# Patient Record
Sex: FEMALE | Race: BLACK OR AFRICAN AMERICAN | NOT HISPANIC OR LATINO
[De-identification: names, ages, dates, MRNs, and addresses within clinical notes are randomized per-mention and may not be internally consistent; named-entity substitution may affect disease eponyms.]

---

## 2021-06-10 ENCOUNTER — RESULT REVIEW (OUTPATIENT)
Age: 26
End: 2021-06-10

## 2022-09-06 ENCOUNTER — NON-APPOINTMENT (OUTPATIENT)
Age: 27
End: 2022-09-06

## 2023-02-08 ENCOUNTER — EMERGENCY (EMERGENCY)
Facility: HOSPITAL | Age: 28
LOS: 1 days | Discharge: HOME | End: 2023-02-08
Attending: STUDENT IN AN ORGANIZED HEALTH CARE EDUCATION/TRAINING PROGRAM | Admitting: STUDENT IN AN ORGANIZED HEALTH CARE EDUCATION/TRAINING PROGRAM
Payer: COMMERCIAL

## 2023-02-08 VITALS
DIASTOLIC BLOOD PRESSURE: 77 MMHG | RESPIRATION RATE: 18 BRPM | HEART RATE: 85 BPM | TEMPERATURE: 98 F | OXYGEN SATURATION: 98 % | SYSTOLIC BLOOD PRESSURE: 110 MMHG

## 2023-02-08 LAB
ALBUMIN SERPL ELPH-MCNC: 4.3 G/DL — SIGNIFICANT CHANGE UP (ref 3.3–5)
ALP SERPL-CCNC: 28 U/L — LOW (ref 40–120)
ALT FLD-CCNC: 6 U/L — SIGNIFICANT CHANGE UP (ref 4–33)
ANION GAP SERPL CALC-SCNC: 9 MMOL/L — SIGNIFICANT CHANGE UP (ref 7–14)
AST SERPL-CCNC: 16 U/L — SIGNIFICANT CHANGE UP (ref 4–32)
BASOPHILS # BLD AUTO: 0.02 K/UL — SIGNIFICANT CHANGE UP (ref 0–0.2)
BASOPHILS NFR BLD AUTO: 0.4 % — SIGNIFICANT CHANGE UP (ref 0–2)
BILIRUB SERPL-MCNC: 0.2 MG/DL — SIGNIFICANT CHANGE UP (ref 0.2–1.2)
BUN SERPL-MCNC: 10 MG/DL — SIGNIFICANT CHANGE UP (ref 7–23)
CALCIUM SERPL-MCNC: 9.2 MG/DL — SIGNIFICANT CHANGE UP (ref 8.4–10.5)
CHLORIDE SERPL-SCNC: 107 MMOL/L — SIGNIFICANT CHANGE UP (ref 98–107)
CO2 SERPL-SCNC: 22 MMOL/L — SIGNIFICANT CHANGE UP (ref 22–31)
CREAT SERPL-MCNC: 0.77 MG/DL — SIGNIFICANT CHANGE UP (ref 0.5–1.3)
EGFR: 108 ML/MIN/1.73M2 — SIGNIFICANT CHANGE UP
EOSINOPHIL # BLD AUTO: 0.08 K/UL — SIGNIFICANT CHANGE UP (ref 0–0.5)
EOSINOPHIL NFR BLD AUTO: 1.7 % — SIGNIFICANT CHANGE UP (ref 0–6)
GLUCOSE SERPL-MCNC: 89 MG/DL — SIGNIFICANT CHANGE UP (ref 70–99)
HCT VFR BLD CALC: 32.4 % — LOW (ref 34.5–45)
HGB BLD-MCNC: 10 G/DL — LOW (ref 11.5–15.5)
IANC: 1.75 K/UL — LOW (ref 1.8–7.4)
IMM GRANULOCYTES NFR BLD AUTO: 0 % — SIGNIFICANT CHANGE UP (ref 0–0.9)
LYMPHOCYTES # BLD AUTO: 2.6 K/UL — SIGNIFICANT CHANGE UP (ref 1–3.3)
LYMPHOCYTES # BLD AUTO: 54.1 % — HIGH (ref 13–44)
MAGNESIUM SERPL-MCNC: 2.1 MG/DL — SIGNIFICANT CHANGE UP (ref 1.6–2.6)
MCHC RBC-ENTMCNC: 22.1 PG — LOW (ref 27–34)
MCHC RBC-ENTMCNC: 30.9 GM/DL — LOW (ref 32–36)
MCV RBC AUTO: 71.7 FL — LOW (ref 80–100)
MONOCYTES # BLD AUTO: 0.36 K/UL — SIGNIFICANT CHANGE UP (ref 0–0.9)
MONOCYTES NFR BLD AUTO: 7.5 % — SIGNIFICANT CHANGE UP (ref 2–14)
NEUTROPHILS # BLD AUTO: 1.75 K/UL — LOW (ref 1.8–7.4)
NEUTROPHILS NFR BLD AUTO: 36.3 % — LOW (ref 43–77)
NRBC # BLD: 0 /100 WBCS — SIGNIFICANT CHANGE UP (ref 0–0)
NRBC # FLD: 0 K/UL — SIGNIFICANT CHANGE UP (ref 0–0)
PHOSPHATE SERPL-MCNC: 3.5 MG/DL — SIGNIFICANT CHANGE UP (ref 2.5–4.5)
PLATELET # BLD AUTO: 216 K/UL — SIGNIFICANT CHANGE UP (ref 150–400)
POTASSIUM SERPL-MCNC: 4.1 MMOL/L — SIGNIFICANT CHANGE UP (ref 3.5–5.3)
POTASSIUM SERPL-SCNC: 4.1 MMOL/L — SIGNIFICANT CHANGE UP (ref 3.5–5.3)
PROT SERPL-MCNC: 7.3 G/DL — SIGNIFICANT CHANGE UP (ref 6–8.3)
RBC # BLD: 4.52 M/UL — SIGNIFICANT CHANGE UP (ref 3.8–5.2)
RBC # FLD: 14.7 % — HIGH (ref 10.3–14.5)
SODIUM SERPL-SCNC: 138 MMOL/L — SIGNIFICANT CHANGE UP (ref 135–145)
TROPONIN T, HIGH SENSITIVITY RESULT: <6 NG/L — SIGNIFICANT CHANGE UP
TSH SERPL-MCNC: 1.37 UIU/ML — SIGNIFICANT CHANGE UP (ref 0.27–4.2)
WBC # BLD: 4.81 K/UL — SIGNIFICANT CHANGE UP (ref 3.8–10.5)
WBC # FLD AUTO: 4.81 K/UL — SIGNIFICANT CHANGE UP (ref 3.8–10.5)

## 2023-02-08 PROCEDURE — 71046 X-RAY EXAM CHEST 2 VIEWS: CPT | Mod: 26

## 2023-02-08 PROCEDURE — 99285 EMERGENCY DEPT VISIT HI MDM: CPT

## 2023-02-08 NOTE — ED PROVIDER NOTE - NS ED ROS FT
ROS   GENERAL: No fever, no chills, no malaise, no fatigue   ENT: No earache, no coryza, no sore throat   NECK: No stiffness, no swollen glands, no dysphagia, no masses or goiter  RESPIRATORY: No cough, no dyspnea, no pleuritic chest pain   HEART: See HPI  ABDOMEN: No abdominal pain, no nausea, no vomiting, no diarrhea   : No dysuria, no increase frequency, no urgency, No discharge   MUSCULAR-SKELETAL: No myalgia, no arthralgia   NEUROLOGY: No headache, no vertigo, no paresthesia, no focal deficits, no diplopia   SKIN: No rash, no evidence of trauma  All other ROS are negative

## 2023-02-08 NOTE — ED PROVIDER NOTE - PATIENT PORTAL LINK FT
You can access the FollowMyHealth Patient Portal offered by [facility] by registering at the following website: [email or web address]. By joining [employer]’s FollowMyHealth portal, you will also be able to view your health information using other applications (apps) compatible with our system.

## 2023-02-08 NOTE — ED ADULT NURSE NOTE - NSIMPLEMENTINTERV_GEN_ALL_ED
Implemented All Universal Safety Interventions:  [place] to call system. Call bell, personal items and telephone within reach. Instruct patient to call for assistance. Room bathroom lighting operational. Non-slip footwear when patient is off stretcher. Physically safe environment: no spills, clutter or unnecessary equipment. Stretcher in lowest position, wheels locked, appropriate side rails in place.

## 2023-02-08 NOTE — ED PROVIDER NOTE - CLINICAL SUMMARY MEDICAL DECISION MAKING FREE TEXT BOX
20-year-old female denies past medical history currently being evaluated for palpitations by [facility] with place Holter monitor presents complaining of recurrent episode of dizziness with associated heart rate in the 150s today as per her Apple Watch. Patient states she has a follow-up appointment in 3 weeks for echocardiogram and mailed out the Holter monitor tomorrow.  Today while on the train she became lightheaded and dizzy and her symptoms persisted after getting home so she came to the emergency department.  Currently asymptomatic at this time and heart rate 85 on presentation.  Possible dysrhythmia versus electrolyte abnormality versus ACS less likely.  Will obtain CBC, CMP, magnesium, phosphorus, EKG, TNI.  Place patient on cardiac monitoring for dysrhythmia evaluation.  Symptomatic control as needed.

## 2023-02-08 NOTE — ED ADULT NURSE NOTE - OBJECTIVE STATEMENT
pt received at intake AAOx 3. pt reports dizziness and palpitations x 2 weeks. pt states she has a halter monitor placed. pt also reports nausea and generalized weakness. pt denies sob, chest pain, fevers, chills. RR even and unlabored. 20g iv placed to left arm.

## 2023-02-08 NOTE — ED PROVIDER NOTE - PHYSICAL EXAMINATION
GEN - NAD; well appearing; A&O x3   HEAD - NC/AT   EYES- PERRL, EOMI  ENT: Airway patent, mmm, Oral cavity and pharynx normal. No inflammation, swelling, exudate, or lesions.  NECK: Neck supple, no masses.  PULMONARY - CTA b/l, symmetric breath sounds. No W/R/R.  CARDIAC -s1s2, RRR, no M,G,R, No JVD   EXTREMITIES - FROM, symmetric pulses, capillary refill < 2 seconds, no edema, 5/5 strength in b/l UE and LE  SKIN - no rash or bruising   NEUROLOGIC - alert, speech clear, no focal deficits, CN II-XII grossly intact, normal gait, sensation grossly intact  PSYCH -nl mood/affect, nl insight.

## 2023-02-08 NOTE — ED PROVIDER NOTE - OBJECTIVE STATEMENT
20-year-old female denies past medical history currently being evaluated for palpitations by [facility] with place Holter monitor presents complaining of recurrent episode of dizziness with associated heart rate in the 150s today as per her Apple Watch. Patient states she has a follow-up appointment in 3 weeks for echocardiogram and mailed out the Holter monitor tomorrow.  Today while on the train she became lightheaded and dizzy and her symptoms persisted after getting home so she came to the emergency department.  Currently asymptomatic at this time and heart rate 85 on presentation.  No known family cardiac history or dysrhythmias.  Paternal grandparents  of cardiac complications in their mid 60s. Symptoms nonexertional and nonpositional.  No known fevers or recent illness.  No known sick contacts.  No medication taken for current symptoms.  No other current complaints this time.

## 2023-02-08 NOTE — ED PROVIDER NOTE - NS ED ATTENDING STATEMENT MOD
This was a shared visit with the GUSTAVO. I reviewed and verified the documentation and independently performed the documented:

## 2023-02-08 NOTE — ED ADULT TRIAGE NOTE - CHIEF COMPLAINT QUOTE
p/t c/o of dizziness and palpitations for 2 weeks, p/t has been wearing a halter monitor  for 2 weeks, p/t c/o of nausea and generalized weakness as well

## 2023-02-08 NOTE — ED PROVIDER NOTE - ATTENDING APP SHARED VISIT CONTRIBUTION OF CARE
I have personally performed a history and physical examination of the patient and discussed management with the GUSTAVO as well as the patient.  I reviewed the GUSTAVO's note and agree with the documented findings and plan of care.  I have authored and modified critical sections of the Provider Note, including but not limited to HPI, Physical Exam and MDM.    20-year-old female denies past medical history [facility] presents complaining of recurrent episode of dizziness with associated heart rate in the 150s today as per her Apple Watch. Patient states she has a follow-up appointment in 3 weeks for echocardiogram and mailed out the Holter monitor tomorrow.  Today while on the train she became lightheaded and dizzy and her symptoms persisted after getting home so she came to the emergency department.  Currently asymptomatic at this time and heart rate 85 on presentation.  Possible dysrhythmia versus electrolyte abnormality versus ACS less likely.  Will obtain CBC, CMP, magnesium, phosphorus, EKG, TNI.  Place patient on cardiac monitoring for dysrhythmia evaluation.  Symptomatic control as needed. Outside source of collateral information provided obtained from sister at bedside who is a nurse. Corroborates patient's story with information regarding cardiac follow-up and states that she has personal history of Graves' disease status post thyroidectomy.  We will screen patient TSH for possible endocrinologic complication. Independent review shows normal sinus rhythm at heart rate of 72 bpm with no STEMI or T WI.  No evidence of QT prolongation with a QTc of 378.  Sinus arrhythmia present.  No apparent delta wave.  No other apparent block patterns. I have personally performed a history and physical examination of the patient and discussed management with the GUSTAVO as well as the patient.  I reviewed the GUSTAVO's note and agree with the documented findings and plan of care.  I have authored and modified critical sections of the Provider Note, including but not limited to HPI, Physical Exam and MDM.    20-year-old female denies past medical history [facility] presents complaining of recurrent episode of dizziness with associated heart rate in the 150s today as per her Apple Watch. Patient states she has a follow-up appointment in 3 weeks for echocardiogram and mailed out the Holter monitor tomorrow.  Today while on the train she became lightheaded and dizzy and her symptoms persisted after getting home so she came to the emergency department.  Currently asymptomatic at this time and heart rate 85 on presentation.  Possible dysrhythmia versus electrolyte abnormality versus ACS less likely.  Will obtain CBC, CMP, magnesium, phosphorus, EKG, TNI.  Place patient on cardiac monitoring for dysrhythmia evaluation.  Symptomatic control as needed. Outside source of collateral information provided obtained from sister at bedside who is a nurse. Corroborates patient's story with information regarding cardiac follow-up and states that she has personal history of Graves' disease status post thyroidectomy.  We will screen patient TSH for possible endocrinologic complication. Independent review shows normal sinus rhythm at heart rate of 72 bpm with no STEMI or T WI.  No evidence of QT prolongation with a QTc of 378.  Sinus arrhythmia present.  No apparent delta wave.  No other apparent block patterns.    Independent review of lab work shows mild anemia hemoglobin of 10, no evidence of coronary disease with troponin negative, no thyroid dysfunction, no evidence of pulmonary disease on independent review of chest x-ray.

## 2024-11-11 ENCOUNTER — APPOINTMENT (OUTPATIENT)
Dept: ORTHOPEDIC SURGERY | Facility: CLINIC | Age: 29
End: 2024-11-11
Payer: COMMERCIAL

## 2024-11-11 VITALS — HEIGHT: 66 IN | WEIGHT: 142 LBS | BODY MASS INDEX: 22.82 KG/M2

## 2024-11-11 DIAGNOSIS — M70.72 OTHER BURSITIS OF HIP, LEFT HIP: ICD-10-CM

## 2024-11-11 DIAGNOSIS — Z78.9 OTHER SPECIFIED HEALTH STATUS: ICD-10-CM

## 2024-11-11 PROCEDURE — 73502 X-RAY EXAM HIP UNI 2-3 VIEWS: CPT

## 2024-11-11 PROCEDURE — 99203 OFFICE O/P NEW LOW 30 MIN: CPT

## 2024-12-02 ENCOUNTER — RESULT REVIEW (OUTPATIENT)
Age: 29
End: 2024-12-02

## 2024-12-09 ENCOUNTER — APPOINTMENT (OUTPATIENT)
Dept: ORTHOPEDIC SURGERY | Facility: CLINIC | Age: 29
End: 2024-12-09
Payer: COMMERCIAL

## 2024-12-09 VITALS — BODY MASS INDEX: 22.82 KG/M2 | WEIGHT: 142 LBS | HEIGHT: 66 IN

## 2024-12-09 DIAGNOSIS — M70.72 OTHER BURSITIS OF HIP, LEFT HIP: ICD-10-CM

## 2024-12-09 PROCEDURE — 99214 OFFICE O/P EST MOD 30 MIN: CPT

## 2024-12-09 RX ORDER — MELOXICAM 15 MG/1
15 TABLET ORAL
Qty: 30 | Refills: 0 | Status: ACTIVE | COMMUNITY
Start: 2024-12-09 | End: 1900-01-01

## 2025-01-29 ENCOUNTER — APPOINTMENT (OUTPATIENT)
Dept: ORTHOPEDIC SURGERY | Facility: CLINIC | Age: 30
End: 2025-01-29